# Patient Record
Sex: FEMALE | Race: OTHER | HISPANIC OR LATINO
[De-identification: names, ages, dates, MRNs, and addresses within clinical notes are randomized per-mention and may not be internally consistent; named-entity substitution may affect disease eponyms.]

---

## 2017-05-05 PROBLEM — Z00.00 ENCOUNTER FOR PREVENTIVE HEALTH EXAMINATION: Status: ACTIVE | Noted: 2017-05-05

## 2017-06-05 ENCOUNTER — APPOINTMENT (OUTPATIENT)
Dept: OBGYN | Facility: CLINIC | Age: 22
End: 2017-06-05

## 2018-03-30 ENCOUNTER — TRANSCRIPTION ENCOUNTER (OUTPATIENT)
Age: 23
End: 2018-03-30

## 2018-05-21 ENCOUNTER — EMERGENCY (EMERGENCY)
Facility: HOSPITAL | Age: 23
LOS: 0 days | Discharge: HOME | End: 2018-05-21
Attending: EMERGENCY MEDICINE | Admitting: EMERGENCY MEDICINE

## 2018-05-21 VITALS
RESPIRATION RATE: 20 BRPM | OXYGEN SATURATION: 100 % | HEART RATE: 84 BPM | TEMPERATURE: 208 F | DIASTOLIC BLOOD PRESSURE: 68 MMHG | SYSTOLIC BLOOD PRESSURE: 118 MMHG
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
## 2018-05-21 DIAGNOSIS — S80.11XA CONTUSION OF RIGHT LOWER LEG, INITIAL ENCOUNTER: ICD-10-CM
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
## 2018-05-21 DIAGNOSIS — S80.12XA CONTUSION OF LEFT LOWER LEG, INITIAL ENCOUNTER: ICD-10-CM

## 2018-05-21 DIAGNOSIS — R56.9 UNSPECIFIED CONVULSIONS: ICD-10-CM
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
## 2018-05-21 LAB
ANION GAP SERPL CALC-SCNC: 13 MMOL/L — SIGNIFICANT CHANGE UP (ref 7–14)
BASOPHILS # BLD AUTO: 0.04 K/UL — SIGNIFICANT CHANGE UP (ref 0–0.2)
BASOPHILS NFR BLD AUTO: 0.5 % — SIGNIFICANT CHANGE UP (ref 0–1)
BUN SERPL-MCNC: 9 MG/DL — LOW (ref 10–20)
CALCIUM SERPL-MCNC: 9.6 MG/DL — SIGNIFICANT CHANGE UP (ref 8.5–10.1)
CHLORIDE SERPL-SCNC: 107 MMOL/L — SIGNIFICANT CHANGE UP (ref 98–110)
CO2 SERPL-SCNC: 23 MMOL/L — SIGNIFICANT CHANGE UP (ref 17–32)
CREAT SERPL-MCNC: 0.7 MG/DL — SIGNIFICANT CHANGE UP (ref 0.7–1.5)
EOSINOPHIL # BLD AUTO: 0.11 K/UL — SIGNIFICANT CHANGE UP (ref 0–0.7)
EOSINOPHIL NFR BLD AUTO: 1.5 % — SIGNIFICANT CHANGE UP (ref 0–8)
GLUCOSE SERPL-MCNC: 91 MG/DL — SIGNIFICANT CHANGE UP (ref 70–99)
HCT VFR BLD CALC: 38.4 % — SIGNIFICANT CHANGE UP (ref 37–47)
HGB BLD-MCNC: 12.7 G/DL — SIGNIFICANT CHANGE UP (ref 12–16)
IMM GRANULOCYTES NFR BLD AUTO: 0.1 % — SIGNIFICANT CHANGE UP (ref 0.1–0.3)
LYMPHOCYTES # BLD AUTO: 2.93 K/UL — SIGNIFICANT CHANGE UP (ref 1.2–3.4)
LYMPHOCYTES # BLD AUTO: 39.8 % — SIGNIFICANT CHANGE UP (ref 20.5–51.1)
MCHC RBC-ENTMCNC: 30.2 PG — SIGNIFICANT CHANGE UP (ref 27–31)
MCHC RBC-ENTMCNC: 33.1 G/DL — SIGNIFICANT CHANGE UP (ref 32–37)
MCV RBC AUTO: 91.2 FL — SIGNIFICANT CHANGE UP (ref 81–99)
MONOCYTES # BLD AUTO: 0.47 K/UL — SIGNIFICANT CHANGE UP (ref 0.1–0.6)
MONOCYTES NFR BLD AUTO: 6.4 % — SIGNIFICANT CHANGE UP (ref 1.7–9.3)
NEUTROPHILS # BLD AUTO: 3.81 K/UL — SIGNIFICANT CHANGE UP (ref 1.4–6.5)
NEUTROPHILS NFR BLD AUTO: 51.7 % — SIGNIFICANT CHANGE UP (ref 42.2–75.2)
NRBC # BLD: 0 /100 WBCS — SIGNIFICANT CHANGE UP (ref 0–0)
PLATELET # BLD AUTO: 270 K/UL — SIGNIFICANT CHANGE UP (ref 130–400)
POTASSIUM SERPL-MCNC: 5 MMOL/L — SIGNIFICANT CHANGE UP (ref 3.5–5)
POTASSIUM SERPL-SCNC: 5 MMOL/L — SIGNIFICANT CHANGE UP (ref 3.5–5)
RBC # BLD: 4.21 M/UL — SIGNIFICANT CHANGE UP (ref 4.2–5.4)
RBC # FLD: 11.9 % — SIGNIFICANT CHANGE UP (ref 11.5–14.5)
SODIUM SERPL-SCNC: 143 MMOL/L — SIGNIFICANT CHANGE UP (ref 135–146)
WBC # BLD: 7.37 K/UL — SIGNIFICANT CHANGE UP (ref 4.8–10.8)
WBC # FLD AUTO: 7.37 K/UL — SIGNIFICANT CHANGE UP (ref 4.8–10.8)

## 2018-05-21 NOTE — ED ADULT TRIAGE NOTE - CHIEF COMPLAINT QUOTE
Pt states she has bruising to legs that is spreading. Pt also states she has pain in bilateral calves. Pt denies swelling.

## 2018-05-21 NOTE — ED PROVIDER NOTE - ATTENDING CONTRIBUTION TO CARE
Pt bethany  22yo female (LMP 2 weeks ago - was heavier than usual) who comes in for painless bruising to b/l lower extremities.  Unknown mechanism.  No CP/SOB/syncope.  No other complaints.      Exam: no LE edema, no calf tenderness, nontender bruising to b/l shins, clear lungs, soft notnender abdomen, pink conjunctivae  Imp: ecchymosis  Plan: CBC, refer to heme

## 2018-05-21 NOTE — ED ADULT NURSE NOTE - OBJECTIVE STATEMENT
Pt presents to ED c/o bruising to b/l legs that she notes is spreading. Pt also reports pain in b/l calves. Pt denies swelling/chills/fevers.

## 2018-05-21 NOTE — ED PROVIDER NOTE - OBJECTIVE STATEMENT
24 y/o F with PMH of seizures presents with cc of bruising that she noticed a month ago on her lower legs. Denies any obvious trauma. No fam hx of easy bruising. No petechiae. No gingival bleeding.

## 2018-09-19 ENCOUNTER — TRANSCRIPTION ENCOUNTER (OUTPATIENT)
Age: 23
End: 2018-09-19

## 2018-11-09 ENCOUNTER — APPOINTMENT (OUTPATIENT)
Dept: BREAST CENTER | Facility: CLINIC | Age: 23
End: 2018-11-09
Payer: COMMERCIAL

## 2018-11-09 VITALS
WEIGHT: 140 LBS | HEIGHT: 66 IN | BODY MASS INDEX: 22.5 KG/M2 | OXYGEN SATURATION: 98 % | HEART RATE: 60 BPM | SYSTOLIC BLOOD PRESSURE: 100 MMHG | DIASTOLIC BLOOD PRESSURE: 60 MMHG

## 2018-11-09 DIAGNOSIS — G40.909 EPILEPSY, UNSPECIFIED, NOT INTRACTABLE, W/OUT STATUS EPILEPTICUS: ICD-10-CM

## 2018-11-09 DIAGNOSIS — N63.20 UNSPECIFIED LUMP IN THE LEFT BREAST, UNSPECIFIED QUADRANT: ICD-10-CM

## 2018-11-09 PROCEDURE — 99203 OFFICE O/P NEW LOW 30 MIN: CPT

## 2018-11-10 PROBLEM — G40.909 EPILEPSY: Status: RESOLVED | Noted: 2018-11-10 | Resolved: 2018-11-10

## 2018-11-10 PROBLEM — N63.20 LEFT BREAST MASS: Status: ACTIVE | Noted: 2018-11-10

## 2018-11-10 RX ORDER — LAMOTRIGINE 2 MG/1
TABLET, FOR SUSPENSION ORAL
Refills: 0 | Status: ACTIVE | COMMUNITY

## 2018-11-10 RX ORDER — ZONISAMIDE 50 MG/1
CAPSULE ORAL
Refills: 0 | Status: ACTIVE | COMMUNITY

## 2018-11-16 ENCOUNTER — LABORATORY RESULT (OUTPATIENT)
Age: 23
End: 2018-11-16

## 2018-11-16 ENCOUNTER — OUTPATIENT (OUTPATIENT)
Dept: OUTPATIENT SERVICES | Facility: HOSPITAL | Age: 23
LOS: 1 days | Discharge: HOME | End: 2018-11-16

## 2018-11-16 VITALS
TEMPERATURE: 98 F | DIASTOLIC BLOOD PRESSURE: 59 MMHG | WEIGHT: 141.1 LBS | RESPIRATION RATE: 15 BRPM | SYSTOLIC BLOOD PRESSURE: 111 MMHG | OXYGEN SATURATION: 99 % | HEART RATE: 74 BPM

## 2018-11-16 DIAGNOSIS — N63.20 UNSPECIFIED LUMP IN THE LEFT BREAST, UNSPECIFIED QUADRANT: ICD-10-CM

## 2018-11-16 DIAGNOSIS — R89.7 ABNORMAL HISTOLOGICAL FINDINGS IN SPECIMENS FROM OTHER ORGANS, SYSTEMS AND TISSUES: ICD-10-CM

## 2018-11-16 DIAGNOSIS — Z01.818 ENCOUNTER FOR OTHER PREPROCEDURAL EXAMINATION: ICD-10-CM

## 2018-11-16 LAB
ALBUMIN SERPL ELPH-MCNC: 4.2 G/DL — SIGNIFICANT CHANGE UP (ref 3.5–5.2)
ALP SERPL-CCNC: 60 U/L — SIGNIFICANT CHANGE UP (ref 30–115)
ALT FLD-CCNC: 13 U/L — SIGNIFICANT CHANGE UP (ref 0–41)
ANION GAP SERPL CALC-SCNC: 13 MMOL/L — SIGNIFICANT CHANGE UP (ref 7–14)
APTT BLD: 35 SEC — SIGNIFICANT CHANGE UP (ref 27–39.2)
AST SERPL-CCNC: 14 U/L — SIGNIFICANT CHANGE UP (ref 0–41)
BASOPHILS # BLD AUTO: 0.03 K/UL — SIGNIFICANT CHANGE UP (ref 0–0.2)
BASOPHILS NFR BLD AUTO: 0.6 % — SIGNIFICANT CHANGE UP (ref 0–1)
BILIRUB SERPL-MCNC: 0.3 MG/DL — SIGNIFICANT CHANGE UP (ref 0.2–1.2)
BUN SERPL-MCNC: 11 MG/DL — SIGNIFICANT CHANGE UP (ref 10–20)
CALCIUM SERPL-MCNC: 9.7 MG/DL — SIGNIFICANT CHANGE UP (ref 8.5–10.1)
CHLORIDE SERPL-SCNC: 107 MMOL/L — SIGNIFICANT CHANGE UP (ref 98–110)
CO2 SERPL-SCNC: 23 MMOL/L — SIGNIFICANT CHANGE UP (ref 17–32)
CREAT SERPL-MCNC: 0.6 MG/DL — LOW (ref 0.7–1.5)
EOSINOPHIL # BLD AUTO: 0.13 K/UL — SIGNIFICANT CHANGE UP (ref 0–0.7)
EOSINOPHIL NFR BLD AUTO: 2.5 % — SIGNIFICANT CHANGE UP (ref 0–8)
GLUCOSE SERPL-MCNC: 81 MG/DL — SIGNIFICANT CHANGE UP (ref 70–99)
HCT VFR BLD CALC: 37.4 % — SIGNIFICANT CHANGE UP (ref 37–47)
HGB BLD-MCNC: 12.5 G/DL — SIGNIFICANT CHANGE UP (ref 12–16)
IMM GRANULOCYTES NFR BLD AUTO: 0.2 % — SIGNIFICANT CHANGE UP (ref 0.1–0.3)
INR BLD: 1.05 RATIO — SIGNIFICANT CHANGE UP (ref 0.65–1.3)
LYMPHOCYTES # BLD AUTO: 1.87 K/UL — SIGNIFICANT CHANGE UP (ref 1.2–3.4)
LYMPHOCYTES # BLD AUTO: 36.2 % — SIGNIFICANT CHANGE UP (ref 20.5–51.1)
MCHC RBC-ENTMCNC: 30.4 PG — SIGNIFICANT CHANGE UP (ref 27–31)
MCHC RBC-ENTMCNC: 33.4 G/DL — SIGNIFICANT CHANGE UP (ref 32–37)
MCV RBC AUTO: 91 FL — SIGNIFICANT CHANGE UP (ref 81–99)
MONOCYTES # BLD AUTO: 0.63 K/UL — HIGH (ref 0.1–0.6)
MONOCYTES NFR BLD AUTO: 12.2 % — HIGH (ref 1.7–9.3)
NEUTROPHILS # BLD AUTO: 2.5 K/UL — SIGNIFICANT CHANGE UP (ref 1.4–6.5)
NEUTROPHILS NFR BLD AUTO: 48.3 % — SIGNIFICANT CHANGE UP (ref 42.2–75.2)
PLATELET # BLD AUTO: 253 K/UL — SIGNIFICANT CHANGE UP (ref 130–400)
POTASSIUM SERPL-MCNC: 4.9 MMOL/L — SIGNIFICANT CHANGE UP (ref 3.5–5)
POTASSIUM SERPL-SCNC: 4.9 MMOL/L — SIGNIFICANT CHANGE UP (ref 3.5–5)
PROT SERPL-MCNC: 7 G/DL — SIGNIFICANT CHANGE UP (ref 6–8)
PROTHROM AB SERPL-ACNC: 12.1 SEC — SIGNIFICANT CHANGE UP (ref 9.95–12.87)
RBC # BLD: 4.11 M/UL — LOW (ref 4.2–5.4)
RBC # FLD: 11.9 % — SIGNIFICANT CHANGE UP (ref 11.5–14.5)
SODIUM SERPL-SCNC: 143 MMOL/L — SIGNIFICANT CHANGE UP (ref 135–146)
WBC # BLD: 5.17 K/UL — SIGNIFICANT CHANGE UP (ref 4.8–10.8)
WBC # FLD AUTO: 5.17 K/UL — SIGNIFICANT CHANGE UP (ref 4.8–10.8)

## 2018-11-16 NOTE — H&P PST ADULT - NSANTHOSAYNRD_GEN_A_CORE
No. DARYL screening performed.  STOP BANG Legend: 0-2 = LOW Risk; 3-4 = INTERMEDIATE Risk; 5-8 = HIGH Risk

## 2018-11-16 NOTE — H&P PST ADULT - FAMILY HISTORY
No pertinent family history in first degree relatives Father  Still living? Yes, Estimated age: Age Unknown  Rheumatoid arthritis, Age at diagnosis: Age Unknown     Mother  Still living? Yes, Estimated age: Age Unknown  HTN (hypertension), Age at diagnosis: Age Unknown

## 2018-11-16 NOTE — H&P PST ADULT - REASON FOR ADMISSION
23 yr old female with electing to have LT breast wide excision local with Dr. Mora 11/30/18 23 yr old female with  4 cm mass behind the LT nipple pt electing to have LT breast wide excision local with Dr. Mora 11/30/18 23 yr old female with  4 cm mass behind the LT nipple pt electing to have LT breast wide excision local with Dr. Francisco  11/30/18

## 2018-11-16 NOTE — H&P PST ADULT - HISTORY OF PRESENT ILLNESS
23 yr old female with electing to have LT breast wide excision local   Pt at present denies any CP, SOB, palpitations, or Dysuria  Pt states she can climb 1 FOS with no SOB  Pt denies DARYL      As per pt this is a complete medical and surgical assessement including prescribed and OTC medications 23 yr old female with e  4 cm mass behind the LT nipple pt electing to have LT breast wide excision local   Pt at present denies any CP, SOB, palpitations, or Dysuria  Pt states she can climb 1 FOS with no SOB  Pt denies DARYL      As per pt this is a complete medical and surgical assessement including prescribed and OTC medications

## 2018-11-17 PROBLEM — R56.9 UNSPECIFIED CONVULSIONS: Chronic | Status: ACTIVE | Noted: 2018-11-16

## 2018-11-30 ENCOUNTER — APPOINTMENT (OUTPATIENT)
Dept: BREAST CENTER | Facility: AMBULATORY SURGERY CENTER | Age: 23
End: 2018-11-30
Payer: COMMERCIAL

## 2018-11-30 ENCOUNTER — RESULT REVIEW (OUTPATIENT)
Age: 23
End: 2018-11-30

## 2018-11-30 ENCOUNTER — OUTPATIENT (OUTPATIENT)
Dept: OUTPATIENT SERVICES | Facility: HOSPITAL | Age: 23
LOS: 1 days | Discharge: HOME | End: 2018-11-30

## 2018-11-30 VITALS
OXYGEN SATURATION: 100 % | TEMPERATURE: 99 F | WEIGHT: 141.1 LBS | HEIGHT: 66 IN | DIASTOLIC BLOOD PRESSURE: 63 MMHG | SYSTOLIC BLOOD PRESSURE: 116 MMHG | RESPIRATION RATE: 17 BRPM | HEART RATE: 61 BPM

## 2018-11-30 VITALS
OXYGEN SATURATION: 100 % | HEART RATE: 56 BPM | DIASTOLIC BLOOD PRESSURE: 58 MMHG | RESPIRATION RATE: 17 BRPM | SYSTOLIC BLOOD PRESSURE: 96 MMHG

## 2018-11-30 PROCEDURE — 19120 REMOVAL OF BREAST LESION: CPT | Mod: LT

## 2018-11-30 RX ORDER — HYDROMORPHONE HYDROCHLORIDE 2 MG/ML
0.5 INJECTION INTRAMUSCULAR; INTRAVENOUS; SUBCUTANEOUS
Qty: 0 | Refills: 0 | Status: DISCONTINUED | OUTPATIENT
Start: 2018-11-30 | End: 2018-11-30

## 2018-11-30 RX ORDER — ZONISAMIDE 100 MG
7 CAPSULE ORAL
Qty: 0 | Refills: 0 | COMMUNITY

## 2018-11-30 RX ORDER — ZONISAMIDE 100 MG
2 CAPSULE ORAL
Qty: 0 | Refills: 0 | COMMUNITY

## 2018-11-30 RX ORDER — SODIUM CHLORIDE 9 MG/ML
1000 INJECTION, SOLUTION INTRAVENOUS
Qty: 0 | Refills: 0 | Status: DISCONTINUED | OUTPATIENT
Start: 2018-11-30 | End: 2018-12-15

## 2018-11-30 RX ORDER — LAMOTRIGINE 25 MG/1
1 TABLET, ORALLY DISINTEGRATING ORAL
Qty: 0 | Refills: 0 | COMMUNITY

## 2018-11-30 RX ORDER — OXYCODONE AND ACETAMINOPHEN 5; 325 MG/1; MG/1
1 TABLET ORAL ONCE
Qty: 0 | Refills: 0 | Status: DISCONTINUED | OUTPATIENT
Start: 2018-11-30 | End: 2018-11-30

## 2018-11-30 RX ORDER — ONDANSETRON 8 MG/1
4 TABLET, FILM COATED ORAL ONCE
Qty: 0 | Refills: 0 | Status: DISCONTINUED | OUTPATIENT
Start: 2018-11-30 | End: 2018-12-15

## 2018-11-30 RX ORDER — MORPHINE SULFATE 50 MG/1
2 CAPSULE, EXTENDED RELEASE ORAL
Qty: 0 | Refills: 0 | Status: DISCONTINUED | OUTPATIENT
Start: 2018-11-30 | End: 2018-11-30

## 2018-11-30 RX ORDER — IBUPROFEN 200 MG
1 TABLET ORAL
Qty: 20 | Refills: 0 | OUTPATIENT
Start: 2018-11-30

## 2018-11-30 RX ADMIN — SODIUM CHLORIDE 100 MILLILITER(S): 9 INJECTION, SOLUTION INTRAVENOUS at 08:40

## 2018-11-30 NOTE — BRIEF OPERATIVE NOTE - PROCEDURE
<<-----Click on this checkbox to enter Procedure Excision of left breast mass  11/30/2018    Active  [id number]

## 2018-12-04 DIAGNOSIS — N63.21 UNSPECIFIED LUMP IN THE LEFT BREAST, UPPER OUTER QUADRANT: ICD-10-CM

## 2018-12-04 DIAGNOSIS — D24.2 BENIGN NEOPLASM OF LEFT BREAST: ICD-10-CM

## 2018-12-04 DIAGNOSIS — R56.9 UNSPECIFIED CONVULSIONS: ICD-10-CM

## 2018-12-04 LAB — SURGICAL PATHOLOGY STUDY: SIGNIFICANT CHANGE UP

## 2018-12-17 ENCOUNTER — APPOINTMENT (OUTPATIENT)
Dept: BREAST CENTER | Facility: CLINIC | Age: 23
End: 2018-12-17
Payer: COMMERCIAL

## 2018-12-17 VITALS
HEIGHT: 66 IN | OXYGEN SATURATION: 97 % | SYSTOLIC BLOOD PRESSURE: 100 MMHG | DIASTOLIC BLOOD PRESSURE: 60 MMHG | HEART RATE: 70 BPM | BODY MASS INDEX: 22.5 KG/M2 | WEIGHT: 140 LBS

## 2018-12-17 PROCEDURE — 99024 POSTOP FOLLOW-UP VISIT: CPT

## 2019-05-20 ENCOUNTER — FORM ENCOUNTER (OUTPATIENT)
Age: 24
End: 2019-05-20

## 2019-05-21 ENCOUNTER — OUTPATIENT (OUTPATIENT)
Dept: OUTPATIENT SERVICES | Facility: HOSPITAL | Age: 24
LOS: 1 days | Discharge: HOME | End: 2019-05-21
Payer: COMMERCIAL

## 2019-05-21 DIAGNOSIS — R92.8 OTHER ABNORMAL AND INCONCLUSIVE FINDINGS ON DIAGNOSTIC IMAGING OF BREAST: ICD-10-CM

## 2019-05-21 PROCEDURE — 76642 ULTRASOUND BREAST LIMITED: CPT | Mod: 26,LT

## 2019-06-07 ENCOUNTER — APPOINTMENT (OUTPATIENT)
Dept: BREAST CENTER | Facility: CLINIC | Age: 24
End: 2019-06-07
Payer: COMMERCIAL

## 2019-06-07 VITALS
HEIGHT: 66 IN | SYSTOLIC BLOOD PRESSURE: 116 MMHG | TEMPERATURE: 98.1 F | DIASTOLIC BLOOD PRESSURE: 78 MMHG | BODY MASS INDEX: 22.5 KG/M2 | WEIGHT: 140 LBS

## 2019-06-07 PROCEDURE — 99213 OFFICE O/P EST LOW 20 MIN: CPT

## 2019-06-08 ENCOUNTER — TRANSCRIPTION ENCOUNTER (OUTPATIENT)
Age: 24
End: 2019-06-08

## 2019-06-09 NOTE — REVIEW OF SYSTEMS
[As Noted in HPI] : as noted in HPI [Fever] : no fever [Chills] : no chills [Skin Lesions] : no skin lesions [Skin Wound] : no skin wound [Breast Lump] : no breast lump [Breast Pain] : no breast pain [Negative] : Heme/Lymph

## 2019-06-09 NOTE — HISTORY OF PRESENT ILLNESS
[FreeTextEntry1] : Jenna is a 24 premenopausal F with a self palpated left breast mass, bx proven fibroepithelial lesion. 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  She does not have any pain associated with the mass, and does not think that it has changed in size since she first noticed it.  She has not palpated any abnormal lesions in her right breast.  She denies any nipple discharge or retraction and has no other breast related complaints. \par \par HISTORICAL RISK FACTORS: \par -1 prior breast biopsy as above \par -no family history of breast or ovarian cancer \par -G0\par -no prior OCP use \par \par Of note, she does have a PMHx significant for epilepsy that she is taking lamictal and zonisamide.  Her last seizure was over 1 year prior. \par \par INTERVAL HISTORY: \par Jenna presents today with her mother for a 6 month follow up for a left fibroadenoma excision.  She has no new breast related complaints.  She has not palpated any new breast masses, denies any breast pain, and denies any nipple discharge or retraction.  She had a repeat L breast US on 5/21/19 which revealed two hypoechoic masses @12:00, retroareolar -- unchanged and @9:00, retroareolar, new and deemed BIRADS 3.  \par \par

## 2019-06-09 NOTE — PAST MEDICAL HISTORY
[Menstruating] : The patient is menstruating [Menarche Age ____] : age at menarche was [unfilled] [Definite ___ (Date)] : the last menstrual period was [unfilled] [Total Preg ___] : G[unfilled] [Normal Duration] : the duration was normal [Regular Cycle Intervals] : have been regular [History of Hormone Replacement Treatment] : has no history of hormone replacement treatment [FreeTextEntry5] : denies [FreeTextEntry6] : denies [FreeTextEntry7] : denies [FreeTextEntry8] : n/a

## 2019-06-09 NOTE — DATA REVIEWED
[FreeTextEntry1] : \par EXAM: US BREAST LIMITED LT \par \par *** ADDENDUM 06/07/2019 *** \par \par The patient returned for prior imaging for comparison. \par \par Prior ultrasound dated 9/28/2018 is available for comparison. \par \par Upon further review, the 0.7 cm hypoechoic mass of the left breast 9:00 \par position was not documented on prior imaging however, it is circumscribed \par and parallel. It is therefore probably benign. \par \par The mass at the 12:00 position retro-/periareolar region is unchanged \par compared to prior exam. \par \par Impression: \par \par Probably benign left breast 9:00 and 12:00 position masses as above. \par \par Recommendation: Follow-up breast ultrasound in 6 months. 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 Patient \twan also presents for six-month ultrasound follow-up of a probably benign \par hypoechoic mass in the left breast first identified in September 2018. \par \par The patient reports her last clinical breast examination was performed 6 \par months ago. \par \par Family history: None \par \par Comparisons: None. \par \par Findings: \par \par Ultrasound: \par \par Targeted unilateral left breast ultrasound was performed. \par \par At the 12:00 position retro-/periareolar region, there is a circumscribed \par hypoechoic mass measuring 0.9 x 1.1 x 0.6 cm. \par \par At the 9:00 position retro-/periareolar region, there is a circumscribed \par hypoechoic mass measuring 0.7 x 0.6 x 0.3 cm. \par \par Impression: Circumscribed left breast hypoechoic masses as above. The \par patient's mother stated that she will return with prior imaging for \par comparison. However, no prior images are available to date. \par \par BI-RADS Category 0: Incomplete: Needs Additional Imaging Evaluation \par \par RECOMMENDATION: \par Patient will be recalled for additional views. Prior imaging required for \par comparison. \par \par The above findings and recommendations were discussed with the patient at \par the time of the examination. \par \par ***Please see the addendum at the top of this report. It may contain \par additional important information or changes.**** \par \par \par \par \par GÉNESIS MCLEAN M.D., ATTENDING RADIOLOGIST \par This document has been electronically signed. May 31 2019 8:59AM \par Addend: GÉNESIS MCLEAN M.D., ATTENDING RADIOLOGIST \par This addendum was electronically signed on: Jun 7 2019 9:57AM.

## 2019-06-09 NOTE — PHYSICAL EXAM
[Atraumatic] : atraumatic [Normocephalic] : normocephalic [EOMI] : extra ocular movement intact [No Cervical Adenopathy] : no cervical adenopathy [No Supraclavicular Adenopathy] : no supraclavicular adenopathy [Examined in the supine and seated position] : examined in the supine and seated position [Symmetrical] : symmetrical [Soft] : abdomen soft [No Axillary Lymphadenopathy] : no left axillary lymphadenopathy [No Edema] : no edema [Not Tender] : non-tender [No Ulceration] : no ulceration [No Rashes] : no rashes [No dominant masses] : no dominant masses in right breast  [No Nipple Retraction] : no right nipple retraction [No dominant masses] : no dominant masses left breast [de-identified] : no suspicious masses palpated  [No Nipple Discharge] : no right nipple discharge [de-identified] : surgical incision is well healed, no suspicious masses palpated

## 2019-06-09 NOTE — ASSESSMENT
[FreeTextEntry1] : Jenna is a 24 premenopausal F with a left breast mass, s/p L WLE revealing a cellular fibroadenoma. She has two BIRADS 3 left breast masses seen on US. \par \par She is healing well from surgery, s/p L WLE On 11/30/18. \par \par On physical exam, her surgical incision is well healed and no new suspicious masses were palpated in either breast.  She underwent a L breast US on 5/21/19 which revealed stability of her lesion @12:00, measuring 1.1 x 0.6 x 0.9 cm and an additional lesion in her left breast @9:00, retroareolar location, measuring 0.7 x 0.6 x 0.3 cm also deemed BIRADS 3.  She will be due for a repeat L breast US on 11/21/19. This will be scheduled for her today.  I will have her follow up after her repeat imaging for a CBE. \par   \par AS REVIEW: \par We discussed fibroadenomas.  These are benign lesions without any malignant potential.  They are hormonally influenced and can increase or decrease in size and can also regress spontaneously.  They are considered proliferative lesions without atypia.  Patients with these lesions have been found to have a slightly increased relative risk of breast cancer compared to the reference population.  Surgical excision is recommended when the diagnosis in unclear, the lesion is causing pain or breast deformity, or if it is rapidly enlarging. \par \par The reason that we recommend surgical excision for a rapidly enlarging fibroadenoma is because there is a possibility that this could be a Phyllodes Tumor.  The treatment of this lesion is wide local excision with 1 cm margins.  A sentinel lymph node would not be necessary as this disease very rarely spreads to the lymph nodes. \par \par Her final diagnosis yielded a cellular fibroadenoma, so no further surgical intervention is indicated at this time. \par \par She is otherwise at an average risk for breast cancer and should start annual screening mammograms at the age of 40. \par \par All of her questions were answered.  She knows to call with any further questions or concerns. \par \par PLAN: \par -L breast US due on 11/21/19 \par -f/up after for CBE

## 2019-11-28 ENCOUNTER — FORM ENCOUNTER (OUTPATIENT)
Age: 24
End: 2019-11-28

## 2019-11-29 ENCOUNTER — OUTPATIENT (OUTPATIENT)
Dept: OUTPATIENT SERVICES | Facility: HOSPITAL | Age: 24
LOS: 1 days | Discharge: HOME | End: 2019-11-29
Payer: COMMERCIAL

## 2019-11-29 DIAGNOSIS — R92.8 OTHER ABNORMAL AND INCONCLUSIVE FINDINGS ON DIAGNOSTIC IMAGING OF BREAST: ICD-10-CM

## 2019-11-29 PROCEDURE — 76642 ULTRASOUND BREAST LIMITED: CPT | Mod: 26,LT

## 2019-12-11 ENCOUNTER — APPOINTMENT (OUTPATIENT)
Dept: BREAST CENTER | Facility: CLINIC | Age: 24
End: 2019-12-11
Payer: COMMERCIAL

## 2019-12-11 VITALS
WEIGHT: 140 LBS | HEIGHT: 66 IN | DIASTOLIC BLOOD PRESSURE: 70 MMHG | SYSTOLIC BLOOD PRESSURE: 112 MMHG | BODY MASS INDEX: 22.5 KG/M2 | TEMPERATURE: 98.7 F

## 2019-12-11 DIAGNOSIS — D24.2 BENIGN NEOPLASM OF LEFT BREAST: ICD-10-CM

## 2019-12-11 DIAGNOSIS — R92.8 OTHER ABNORMAL AND INCONCLUSIVE FINDINGS ON DIAGNOSTIC IMAGING OF BREAST: ICD-10-CM

## 2019-12-11 PROCEDURE — 99213 OFFICE O/P EST LOW 20 MIN: CPT

## 2019-12-11 NOTE — ASSESSMENT
[FreeTextEntry1] : Jenna is a 24 premenopausal F with a left breast mass, s/p L WLE revealing a cellular fibroadenoma. She has two BIRADS 3 left breast masses seen on US. \par \par -s/p L WLE On 11/30/18, revealing a fibroadenoma\par \par On physical exam, her surgical incision is well healed and no new suspicious masses were palpated in either breast.  She had a repeat L breast US on 11/29/19 which revealed two hypoechoic masses @12:00, retroareolar -- unchanged and @9:00, retroareolar, also unchanged, deemed BIRADS 3.  She will be due for a repeat L breast US on 5/29/2020. She was provided with a prescription for her repeat breast US.  \par \par She was encouraged to re-establish care with a breast surgeon once she moves to [place].  She knows to call back with any further questions or concerns. \par   \par AS REVIEW: \par We discussed fibroadenomas.  These are benign lesions without any malignant potential.  They are hormonally influenced and can increase or decrease in size and can also regress spontaneously.  They are considered proliferative lesions without atypia.  Patients with these lesions have been found to have a slightly increased relative risk of breast cancer compared to the reference population.  Surgical excision is recommended when the diagnosis in unclear, the lesion is causing pain or breast deformity, or if it is rapidly enlarging. \par \par The reason that we recommend surgical excision for a rapidly enlarging fibroadenoma is because there is a possibility that this could be a Phyllodes Tumor.  The treatment of this lesion is wide local excision with 1 cm margins.  A sentinel lymph node would not be necessary as this disease very rarely spreads to the lymph nodes. \par \par Her final diagnosis yielded a cellular fibroadenoma, so no further surgical intervention is indicated at this time. \par \par She is otherwise at an average risk for breast cancer and should start annual screening mammograms at the age of 42. \par \par All of her questions were answered.  She knows to call with any further questions or concerns. \par \par PLAN: \par -L breast US due on 5/29/2020 \par -f/up PRN as she is moving to [place]

## 2019-12-11 NOTE — HISTORY OF PRESENT ILLNESS
[FreeTextEntry1] : Jenna is a 24 premenopausal F with a self palpated left breast mass, bx proven fibroepithelial lesion. 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  She does not have any pain associated with the mass, and does not think that it has changed in size since she first noticed it.  She has not palpated any abnormal lesions in her right breast.  She denies any nipple discharge or retraction and has no other breast related complaints. \par \par HISTORICAL RISK FACTORS: \par -1 prior breast biopsy as above \par -no family history of breast or ovarian cancer \par -G0\par -no prior OCP use \par \par Of note, she does have a PMHx significant for epilepsy that she is taking lamictal and zonisamide.  Her last seizure was over 1 year prior. \par \par INTERVAL HISTORY: \par Jenna presents today with her mother for a 6 month follow up for a left fibroadenoma excision.  She has no new breast related complaints.  She has not palpated any new breast masses, denies any breast pain, and denies any nipple discharge or retraction.  She had a repeat L breast US on 11/29/19 which revealed two hypoechoic masses @12:00, retroareolar -- unchanged and @9:00, retroareolar, also unchanged, deemed BIRADS 3.  \par \par Of note, she is moving to [place] in 2 weeks and will re-establish her breast care after she moves. \par

## 2019-12-11 NOTE — PHYSICAL EXAM
[Normocephalic] : normocephalic [Atraumatic] : atraumatic [EOMI] : extra ocular movement intact [No Supraclavicular Adenopathy] : no supraclavicular adenopathy [No Cervical Adenopathy] : no cervical adenopathy [Examined in the supine and seated position] : examined in the supine and seated position [Symmetrical] : symmetrical [No dominant masses] : no dominant masses in right breast  [No dominant masses] : no dominant masses left breast [No Nipple Retraction] : no left nipple retraction [No Nipple Discharge] : no left nipple discharge [Not Tender] : non-tender [No Axillary Lymphadenopathy] : no left axillary lymphadenopathy [Soft] : abdomen soft [No Rashes] : no rashes [No Edema] : no edema [No Ulceration] : no ulceration [de-identified] : no suspicious masses palpated although she does have nonspecific nodularities in the UOQ  [de-identified] : surgical incision is well healed, nonspecific nodularities palpated in the retroareolar area

## 2019-12-11 NOTE — REVIEW OF SYSTEMS
[As Noted in HPI] : as noted in HPI [Negative] : Heme/Lymph [Fever] : no fever [Skin Lesions] : no skin lesions [Skin Wound] : no skin wound [Chills] : no chills [Breast Pain] : no breast pain [Breast Lump] : no breast lump

## 2019-12-11 NOTE — PAST MEDICAL HISTORY
[Menstruating] : The patient is menstruating [Menarche Age ____] : age at menarche was [unfilled] [Definite ___ (Date)] : the last menstrual period was [unfilled] [Normal Duration] : the duration was normal [Regular Cycle Intervals] : have been regular [Total Preg ___] : G[unfilled] [History of Hormone Replacement Treatment] : has no history of hormone replacement treatment [FreeTextEntry5] : denies [FreeTextEntry7] : denies [FreeTextEntry6] : denies [FreeTextEntry8] : n/a

## 2019-12-11 NOTE — DATA REVIEWED
[FreeTextEntry1] : EXAM: US BREAST LIMITED LT\par \par \par PROCEDURE DATE: 11/29/2019\par \par \par \par INTERPRETATION: Clinical History / Reason for exam: Follow-up left breast\par masses.\par \par The patient reports her last clinical breast examination was performed\par uncertain.\par \par Family history: No family history of breast cancer.\par \par Breast composition: The breasts are heterogeneously dense, which may obscure\par small masses.\par \par Comparisons: Breast ultrasound dating back to 2018.\par \par Findings:\par \par Ultrasound:\par \par Targeted unilateral left breast ultrasound was performed.\par \par At 12:00 retroareolar region of the left breast there is redemonstration of\par a stable oval circumscribed mass measuring 0.9 x 1.1 x 0.5 cm. Short\par interval follow-up with ultrasound in 6 months recommended.\par At 9:00 o'clock retroareolar region of the left breast there is a stable\par oval circumscribed mass measuring 0.7 x 0.6 x 0.3 cm. Short interval\par follow-up with ultrasound in 6 months recommended.\par \par Impression:\par 1. Stable left breast masses as described above. Short interval follow-up\par with ultrasound recommended.\par \par Recommendation: Follow-up breast ultrasound in 6 months.\par \par BI-RADS category 3: Probably Benign\par \par \par \par \par The above findings and recommendations were discussed with the patient at\par the time of the examination.\par \par \par \par \par \par \par KERRI DAUGHERTY M.D., ATTENDING RADIOLOGIST\par This document has been electronically signed. Nov 29 2019 2:23PM\par

## 2019-12-19 ENCOUNTER — APPOINTMENT (OUTPATIENT)
Dept: BREAST CENTER | Facility: CLINIC | Age: 24
End: 2019-12-19

## 2020-03-12 ENCOUNTER — TRANSCRIPTION ENCOUNTER (OUTPATIENT)
Age: 25
End: 2020-03-12

## 2020-07-14 NOTE — ASU DISCHARGE PLAN (ADULT/PEDIATRIC). - NS DC INTERPRETER YES NO
[facility] ED to IP Report (EDIP)    Mental Status     Alert and oriented    Safety     Fall Risk    Tele  Yes    Oxygen Needs  2 LPM supplemental oxygen, Nasal cannula    Mobility    unable to assess in ED    Protocols  None    ISAR          Isolation  Contact and Droplet     Additional Information: Covid swab done at urgent care prior to arrival at ED.   No

## 2023-02-08 NOTE — H&P PST ADULT - BP NONINVASIVE DIASTOLIC (MM HG)
59 Complex Repair And Epidermal Autograft Text: The defect edges were debeveled with a #15 scalpel blade.  The primary defect was closed partially with a complex linear closure.  Given the location of the defect, shape of the defect and the proximity to free margins an epidermal autograft was deemed most appropriate to repair the remaining defect.  The graft was trimmed to fit the size of the remaining defect.  The graft was then placed in the primary defect, oriented appropriately, and sutured into place.
